# Patient Record
Sex: FEMALE | Race: WHITE | NOT HISPANIC OR LATINO
[De-identification: names, ages, dates, MRNs, and addresses within clinical notes are randomized per-mention and may not be internally consistent; named-entity substitution may affect disease eponyms.]

---

## 2020-11-13 PROBLEM — Z00.00 ENCOUNTER FOR PREVENTIVE HEALTH EXAMINATION: Status: ACTIVE | Noted: 2020-11-13

## 2020-11-17 ENCOUNTER — APPOINTMENT (OUTPATIENT)
Dept: HEMATOLOGY ONCOLOGY | Facility: CLINIC | Age: 70
End: 2020-11-17
Payer: MEDICARE

## 2020-11-17 VITALS
HEART RATE: 84 BPM | TEMPERATURE: 98.5 F | HEIGHT: 65 IN | DIASTOLIC BLOOD PRESSURE: 90 MMHG | OXYGEN SATURATION: 99 % | SYSTOLIC BLOOD PRESSURE: 154 MMHG | RESPIRATION RATE: 18 BRPM | WEIGHT: 153 LBS | BODY MASS INDEX: 25.49 KG/M2

## 2020-11-17 DIAGNOSIS — F51.02 ADJUSTMENT INSOMNIA: ICD-10-CM

## 2020-11-17 DIAGNOSIS — R53.83 OTHER FATIGUE: ICD-10-CM

## 2020-11-17 DIAGNOSIS — Z86.39 PERSONAL HISTORY OF OTHER ENDOCRINE, NUTRITIONAL AND METABOLIC DISEASE: ICD-10-CM

## 2020-11-17 DIAGNOSIS — Z86.2 PERSONAL HISTORY OF DISEASES OF THE BLOOD AND BLOOD-FORMING ORGANS AND CERTAIN DISORDERS INVOLVING THE IMMUNE MECHANISM: ICD-10-CM

## 2020-11-17 DIAGNOSIS — Z87.39 PERSONAL HISTORY OF OTHER DISEASES OF THE MUSCULOSKELETAL SYSTEM AND CONNECTIVE TISSUE: ICD-10-CM

## 2020-11-17 DIAGNOSIS — Z72.89 OTHER PROBLEMS RELATED TO LIFESTYLE: ICD-10-CM

## 2020-11-17 DIAGNOSIS — Z85.9 PERSONAL HISTORY OF MALIGNANT NEOPLASM, UNSPECIFIED: ICD-10-CM

## 2020-11-17 DIAGNOSIS — M81.0 AGE-RELATED OSTEOPOROSIS W/OUT CURRENT PATHOLOGICAL FRACTURE: ICD-10-CM

## 2020-11-17 DIAGNOSIS — Z82.49 FAMILY HISTORY OF ISCHEMIC HEART DISEASE AND OTHER DISEASES OF THE CIRCULATORY SYSTEM: ICD-10-CM

## 2020-11-17 DIAGNOSIS — Z17.0 ESTROGEN RECEPTOR POSITIVE STATUS [ER+]: ICD-10-CM

## 2020-11-17 DIAGNOSIS — Z87.19 PERSONAL HISTORY OF OTHER DISEASES OF THE DIGESTIVE SYSTEM: ICD-10-CM

## 2020-11-17 DIAGNOSIS — D51.9 VITAMIN B12 DEFICIENCY ANEMIA, UNSPECIFIED: ICD-10-CM

## 2020-11-17 DIAGNOSIS — F43.21 ADJUSTMENT DISORDER WITH DEPRESSED MOOD: ICD-10-CM

## 2020-11-17 DIAGNOSIS — Z80.43 FAMILY HISTORY OF MALIGNANT NEOPLASM OF TESTIS: ICD-10-CM

## 2020-11-17 DIAGNOSIS — Z87.440 PERSONAL HISTORY OF URINARY (TRACT) INFECTIONS: ICD-10-CM

## 2020-11-17 PROCEDURE — 99215 OFFICE O/P EST HI 40 MIN: CPT

## 2020-11-17 RX ORDER — PANTOPRAZOLE SODIUM 40 MG/1
40 TABLET, DELAYED RELEASE ORAL
Refills: 0 | Status: ACTIVE | COMMUNITY

## 2020-11-17 RX ORDER — CHOLECALCIFEROL (VITAMIN D3) 10(400)/ML
5000 DROPS ORAL
Refills: 0 | Status: ACTIVE | COMMUNITY

## 2020-11-17 RX ORDER — MULTIVIT-MIN/FOLIC/VIT K/LYCOP 400-300MCG
1000 TABLET ORAL
Refills: 0 | Status: ACTIVE | COMMUNITY

## 2020-11-17 RX ORDER — LINACLOTIDE 145 UG/1
145 CAPSULE, GELATIN COATED ORAL
Refills: 0 | Status: ACTIVE | COMMUNITY

## 2020-11-17 RX ORDER — AMITRIPTYLINE HYDROCHLORIDE 50 MG/1
50 TABLET, FILM COATED ORAL
Refills: 0 | Status: ACTIVE | COMMUNITY

## 2020-11-17 NOTE — HISTORY OF PRESENT ILLNESS
[de-identified] : Ms. 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  She had a lumpectomy and sentinel biopsy revealing 10 mm of tubular carcinoma with no angiolymphatic invasion.  Oncotype DX score was performed and was 16 which is low risk.  She did require a second resection due to a positive margin.  She was seen by another oncologist and was placed on Femara 6 weeks postop but did not tolerated due to increased myofascial pain.  She transferred her care to me in August to 2010 and we discussed possibility of trying different AI's or tamoxifen.  She declined adamantly due to her symptomatic pain syndrome.  She is being followed routinely by rheumatology and other physicians for pain.  She followed regularly with mammograms and ultrasounds as well as breast MRIs. [de-identified] : Has had intermittent mouth sores for 2-3 years\par + fatigue\par + restless leg, on magnesium \par No pica, no CARRERA\par on special diet and supplemets for constipation , now better, on aloe pill seeing functional medicine doctor \par gerd is better , off protonix \par back surgery in feb 2020 \par doing massage , neck pain better \par urti is better \par \par walking again  feeling better \par vit d \par lost weight on purpose  with \par \par Last Mammogram and breast ultrasound: 1/2020 [facility]\par Last Bone density: ~ 5 years ago\par Last colonoscopy: 10/2019\par Last GYN Exam: many years ago, s/p hysterectomy at age 51\par \par

## 2020-11-17 NOTE — ASSESSMENT
[FreeTextEntry1] : THis is a 69 y/o woman with hx of pT1cNo left breast oncotype low risk 16, er pos her2 negative s/p lumpectomy and radiation \par \par 1) breast cancer \par doing well [facility] \par repeat mammo and ultrasound in jan 2021 \par repeat markers \par patient cont to decline any type of antiestrogen including tamoxifen , evista , ai's \par cont close monitiering \par \par 2) osteopenia \par cont vit d and  check level \par dexa every 2 years ordered today \par \par 3) fatigue \par repeat b12 folate tsh and iron studies \par follow up rheumatology \par \par 4) b12 def \par cont b12 check level \par \par 5) constipation \par improved \par s/p follow up gi \par up todate colonoscopy \par \par 6) fibromyalgia and back pain \par improved \par follow up with rheumatology and anestheia \par \par dw patient at length \par follow up in 1 year \par full labs sent today

## 2020-11-17 NOTE — REVIEW OF SYSTEMS
[Fatigue] : fatigue [Negative] : Allergic/Immunologic [Fever] : no fever [Chills] : no chills [Night Sweats] : no night sweats [FreeTextEntry2] : hot flashes/chills [FreeTextEntry3] : + dry eye, on Restasis, helping a bit [FreeTextEntry4] : + dry mouth, attributes it to Cymbalta and amitriptyline, sores in mouth [FreeTextEntry7] : constipation better with dietary changes and vitamin supplements

## 2020-11-17 NOTE — PHYSICAL EXAM
[Restricted in physically strenuous activity but ambulatory and able to carry out work of a light or sedentary nature] : Status 1- Restricted in physically strenuous activity but ambulatory and able to carry out work of a light or sedentary nature, e.g., light house work, office work [Normal] : affect appropriate [de-identified] : no adenopathy , left breast is very dense danay in outer quadrant , no  skin changes , right breast is clear

## 2020-11-17 NOTE — OB HISTORY
[Menarche Age: ____] : age at menarche was [unfilled] [Menopause Age: ____] : age at menopause was [unfilled] [___] : Living: [unfilled]

## 2020-11-17 NOTE — REASON FOR VISIT
[Follow-Up Visit] : a follow-up [FreeTextEntry2] : f/u of breast cancer, left breast infiltrating tubular carcinoma ER+N[place]+ at 90-95%

## 2020-11-18 ENCOUNTER — NON-APPOINTMENT (OUTPATIENT)
Age: 70
End: 2020-11-18

## 2020-11-18 LAB — CEA SERPL-MCNC: 2 NG/ML

## 2020-11-19 LAB
FERRITIN SERPL-MCNC: 189 NG/ML
IRON SATN MFR SERPL: 23 %
IRON SERPL-MCNC: 67 UG/DL
TIBC SERPL-MCNC: 288 UG/DL
UIBC SERPL-MCNC: 221 UG/DL
VIT B12 SERPL-MCNC: 552 PG/ML

## 2020-11-23 LAB
25(OH)D3 SERPL-MCNC: 45 NG/ML
CANCER AG15-3 SERPL-ACNC: 13.8 U/ML

## 2021-04-29 ENCOUNTER — TRANSCRIPTION ENCOUNTER (OUTPATIENT)
Age: 71
End: 2021-04-29

## 2021-11-16 ENCOUNTER — APPOINTMENT (OUTPATIENT)
Dept: HEMATOLOGY ONCOLOGY | Facility: CLINIC | Age: 71
End: 2021-11-16
Payer: MEDICARE

## 2021-11-16 VITALS
SYSTOLIC BLOOD PRESSURE: 140 MMHG | HEIGHT: 65 IN | WEIGHT: 160 LBS | TEMPERATURE: 98.3 F | RESPIRATION RATE: 18 BRPM | BODY MASS INDEX: 26.66 KG/M2 | DIASTOLIC BLOOD PRESSURE: 80 MMHG | HEART RATE: 89 BPM | OXYGEN SATURATION: 100 %

## 2021-11-16 DIAGNOSIS — R53.83 OTHER FATIGUE: ICD-10-CM

## 2021-11-16 DIAGNOSIS — R79.89 OTHER SPECIFIED ABNORMAL FINDINGS OF BLOOD CHEMISTRY: ICD-10-CM

## 2021-11-16 PROCEDURE — 99214 OFFICE O/P EST MOD 30 MIN: CPT

## 2021-11-16 NOTE — HISTORY OF PRESENT ILLNESS
[de-identified] : Ms. 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 She had a lumpectomy and sentinel biopsy revealing 10 mm of tubular carcinoma with no angiolymphatic invasion. Oncotype DX score was performed and was 16 which is low risk. She did require a second resection due to a positive margin. She was seen by another oncologist and was placed on Femara 6 weeks postop but did not tolerated due to increased myofascial pain. She transferred her care to me in August to 2010 and we discussed possibility of trying different AI's or tamoxifen. She declined adamantly due to her symptomatic pain syndrome. She is being followed routinely by rheumatology and other physicians for pain. She followed regularly with mammograms and ultrasounds as well as breast MRIs [de-identified] : Feeling about the same, continues to struggle with joint pains\par mammo  and ultrasound due 1/2022 \par gyn - had hystrectomy does not go to see any more\par colonoscopy up-to-date\par sees rheumatology - a lot joint pains \par second vaccine a lot of pain afterwards\par \par Believes that she had a bone density a year ago but we do not have the results\par

## 2021-11-16 NOTE — PHYSICAL EXAM
[Normal] : affect appropriate [de-identified] :  left breast  continues to be very  dense danay in outer quadrant, patient states that there has been no significant change, no adenopathy no skin changes, no  skin changes , right breast is clear

## 2021-11-16 NOTE — ASSESSMENT
[FreeTextEntry1] : THis is a 71 y/o woman with hx of pT1cNo left breast oncotype low risk 16, er pos her2 negative s/p lumpectomy and radiation \par \par 1) breast cancer \par doing well HENRY although breast is quite dense bilaterally particularly on the left\par repeat mammo and ultrasound in jan 2022 \par repeat markers \par patient cont to decline any type of antiestrogen including tamoxifen , evista , ai's primarily due to the her fears of joint pain getting worse\par cont close monitiering, consider MRI however I am not sure of the role with dense breasts as long as she is getting a 3D mammogram which I advised the patient to do.\par \par 2) osteopenia \par cont vit d and  check level \par Patient states that she had a bone density last year but we did not receive the report I requested that the  obtain this result\par Advised patient active exercise especially weightbearing\par \par 3) fatigue \par repeat b12 folate tsh and iron studies \par follow up rheumatology \par \par 4) b12 def \par cont b12 check level \par \par 5) constipation \par improved \par s/p follow up gi \par up todate colonoscopy \par \par 6) fibromyalgia and back pain \par improved \par follow up with rheumatology and anestheia \par \par dw patient at length \par follow up in 1 year \par full labs sent today

## 2021-11-19 LAB
25(OH)D3 SERPL-MCNC: 43.8 NG/ML
CANCER AG15-3 SERPL-ACNC: 16 U/ML
CEA SERPL-MCNC: 1.4 NG/ML
FERRITIN SERPL-MCNC: 148 NG/ML
FOLATE SERPL-MCNC: 8.5 NG/ML
VIT B12 SERPL-MCNC: 396 PG/ML

## 2021-11-26 ENCOUNTER — NON-APPOINTMENT (OUTPATIENT)
Age: 71
End: 2021-11-26

## 2022-11-16 DIAGNOSIS — M79.7 FIBROMYALGIA: ICD-10-CM

## 2022-11-17 ENCOUNTER — RESULT REVIEW (OUTPATIENT)
Age: 72
End: 2022-11-17

## 2022-11-17 ENCOUNTER — APPOINTMENT (OUTPATIENT)
Dept: HEMATOLOGY ONCOLOGY | Facility: CLINIC | Age: 72
End: 2022-11-17

## 2022-11-17 ENCOUNTER — LABORATORY RESULT (OUTPATIENT)
Age: 72
End: 2022-11-17

## 2022-11-17 VITALS
HEART RATE: 85 BPM | SYSTOLIC BLOOD PRESSURE: 167 MMHG | BODY MASS INDEX: 25.99 KG/M2 | TEMPERATURE: 97.6 F | DIASTOLIC BLOOD PRESSURE: 84 MMHG | OXYGEN SATURATION: 100 % | RESPIRATION RATE: 18 BRPM | WEIGHT: 156 LBS | HEIGHT: 65 IN

## 2022-11-17 PROCEDURE — 99213 OFFICE O/P EST LOW 20 MIN: CPT | Mod: 25

## 2022-11-17 PROCEDURE — 36415 COLL VENOUS BLD VENIPUNCTURE: CPT

## 2022-11-17 RX ORDER — NITROFURANTOIN MACROCRYSTALS 50 MG/1
50 CAPSULE ORAL
Qty: 90 | Refills: 0 | Status: COMPLETED | COMMUNITY
Start: 2022-11-08

## 2022-11-17 RX ORDER — CLOTRIMAZOLE AND BETAMETHASONE DIPROPIONATE 10; .5 MG/G; MG/G
1-0.05 CREAM TOPICAL
Qty: 30 | Refills: 0 | Status: COMPLETED | COMMUNITY
Start: 2022-08-26

## 2022-11-17 RX ORDER — CIPROFLOXACIN HYDROCHLORIDE 500 MG/1
500 TABLET, FILM COATED ORAL
Qty: 14 | Refills: 0 | Status: COMPLETED | COMMUNITY
Start: 2022-10-21

## 2022-11-17 RX ORDER — ACYCLOVIR 800 MG/1
800 TABLET ORAL
Qty: 24 | Refills: 0 | Status: COMPLETED | COMMUNITY
Start: 2022-06-01

## 2022-11-17 RX ORDER — GABAPENTIN 300 MG/1
300 CAPSULE ORAL
Qty: 60 | Refills: 0 | Status: COMPLETED | COMMUNITY
Start: 2022-08-04

## 2022-11-17 RX ORDER — PREDNISONE 50 MG/1
50 TABLET ORAL
Qty: 5 | Refills: 0 | Status: COMPLETED | COMMUNITY
Start: 2022-06-01

## 2022-11-17 RX ORDER — VALACYCLOVIR 500 MG/1
500 TABLET, FILM COATED ORAL
Qty: 30 | Refills: 0 | Status: COMPLETED | COMMUNITY
Start: 2022-06-21

## 2022-11-17 RX ORDER — CYCLOSPORINE 0.5 MG/ML
0.05 EMULSION OPHTHALMIC
Qty: 180 | Refills: 0 | Status: COMPLETED | COMMUNITY
Start: 2022-01-28

## 2022-11-17 RX ORDER — NITROFURANTOIN (MONOHYDRATE/MACROCRYSTALS) 25; 75 MG/1; MG/1
100 CAPSULE ORAL
Qty: 14 | Refills: 0 | Status: COMPLETED | COMMUNITY
Start: 2022-07-23

## 2022-11-17 RX ORDER — ESTRADIOL 0.1 MG/G
0.1 CREAM VAGINAL
Qty: 42 | Refills: 0 | Status: COMPLETED | COMMUNITY
Start: 2021-07-30

## 2022-11-17 RX ORDER — OMEGA-3/DHA/EPA/FISH OIL 300-1000MG
CAPSULE ORAL
Refills: 0 | Status: COMPLETED | COMMUNITY
End: 2022-11-17

## 2022-11-17 RX ORDER — DULOXETINE HYDROCHLORIDE 60 MG/1
60 CAPSULE, DELAYED RELEASE PELLETS ORAL
Qty: 180 | Refills: 0 | Status: COMPLETED | COMMUNITY
Start: 2022-05-20

## 2022-11-17 RX ORDER — FLUCONAZOLE 200 MG/1
200 TABLET ORAL
Qty: 1 | Refills: 0 | Status: COMPLETED | COMMUNITY
Start: 2022-08-26

## 2022-11-17 RX ORDER — NEOMYCIN AND POLYMYXIN B SULFATES AND DEXAMETHASONE 3.5; 10000; 1 MG/G; [IU]/G; MG/G
3.5-10000-0.1 OINTMENT OPHTHALMIC
Qty: 4 | Refills: 0 | Status: COMPLETED | COMMUNITY
Start: 2022-08-09

## 2022-11-17 RX ORDER — HYDROCORTISONE 25 MG/G
2.5 CREAM TOPICAL
Qty: 28 | Refills: 0 | Status: COMPLETED | COMMUNITY
Start: 2022-08-10

## 2022-11-17 RX ORDER — TRAMADOL HYDROCHLORIDE 50 MG/1
50 TABLET, COATED ORAL
Qty: 14 | Refills: 0 | Status: COMPLETED | COMMUNITY
Start: 2022-08-04

## 2022-11-17 RX ORDER — VITAMIN B COMPLEX
CAPSULE ORAL
Refills: 0 | Status: ACTIVE | COMMUNITY

## 2022-11-17 RX ORDER — COVID-19 ANTIGEN TEST
KIT MISCELLANEOUS
Qty: 8 | Refills: 0 | Status: COMPLETED | COMMUNITY
Start: 2022-11-10

## 2022-11-17 RX ORDER — SULFAMETHOXAZOLE AND TRIMETHOPRIM 800; 160 MG/1; MG/1
800-160 TABLET ORAL
Qty: 14 | Refills: 0 | Status: COMPLETED | COMMUNITY
Start: 2022-08-03

## 2022-11-17 RX ORDER — METHENAMINE HIPPURATE 1 G/1
1 TABLET ORAL
Qty: 180 | Refills: 0 | Status: COMPLETED | COMMUNITY
Start: 2022-08-03

## 2022-11-17 RX ORDER — LEVOTHYROXINE SODIUM 50 MCG
50 TABLET ORAL
Refills: 0 | Status: COMPLETED | COMMUNITY
End: 2022-11-17

## 2022-11-17 RX ORDER — VALACYCLOVIR 1 G/1
1 TABLET, FILM COATED ORAL
Qty: 30 | Refills: 0 | Status: COMPLETED | COMMUNITY
Start: 2022-06-21

## 2022-11-24 NOTE — PHYSICAL EXAM
[Normal] : affect appropriate [Restricted in physically strenuous activity but ambulatory and able to carry out work of a light or sedentary nature] : Status 1- Restricted in physically strenuous activity but ambulatory and able to carry out work of a light or sedentary nature, e.g., light house work, office work [de-identified] :  left breast  continues to be very  dense danay in outer quadrant, patient states that there has been no significant change, no adenopathy no skin changes, no  skin changes , right breast is clear

## 2022-11-24 NOTE — HISTORY OF PRESENT ILLNESS
[de-identified] : Ms. 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 She had a lumpectomy and sentinel biopsy revealing 10 mm of tubular carcinoma with no angiolymphatic invasion. Oncotype DX score was performed and was 16 which is low risk. She did require a second resection due to a positive margin. She was seen by another oncologist and was placed on Femara 6 weeks postop but did not tolerated due to increased myofascial pain. She transferred her care to me in August to 2010 and we discussed possibility of trying different AI's or tamoxifen. She declined adamantly due to her symptomatic pain syndrome. She is being followed routinely by rheumatology and other physicians for pain. She followed regularly with mammograms and ultrasounds as well as breast MRIs [de-identified] : \par

## 2022-11-24 NOTE — ASSESSMENT
[FreeTextEntry1] : THis is a 69 y/o woman with hx of pT1cNo left breast oncotype low risk 16, er pos her2 negative s/p lumpectomy and radiation \par Tubulara carcinoma --favourable profile\par \par 1) breast cancer \par doing well HENRY although breast is quite dense bilaterally particularly on the left\par mammo and ultrasound in jan 2022 benign\par patient cont to decline any type of antiestrogen including tamoxifen , evista , ai's primarily due to the her fears of joint pain getting worse\par \par \par 2) osteopenia \par cont vit d \par \par \par 3) fatigue \par repeat b12 folate tsh and iron studies \par follow up rheumatology \par \par 4) b12 def \par cont b12 check level \par \par \par 6) fibromyalgia and back pain \par improved \par follow up with rheumatology \par \par 7) health maintenance\par colonoscopy 1/2023\par

## 2023-08-09 DIAGNOSIS — E53.8 DEFICIENCY OF OTHER SPECIFIED B GROUP VITAMINS: ICD-10-CM

## 2023-08-10 ENCOUNTER — RESULT REVIEW (OUTPATIENT)
Age: 73
End: 2023-08-10

## 2023-08-10 ENCOUNTER — APPOINTMENT (OUTPATIENT)
Dept: HEMATOLOGY ONCOLOGY | Facility: CLINIC | Age: 73
End: 2023-08-10
Payer: MEDICARE

## 2023-08-10 VITALS
BODY MASS INDEX: 25.33 KG/M2 | OXYGEN SATURATION: 100 % | WEIGHT: 152 LBS | SYSTOLIC BLOOD PRESSURE: 152 MMHG | HEART RATE: 92 BPM | HEIGHT: 65 IN | TEMPERATURE: 98.5 F | RESPIRATION RATE: 18 BRPM | DIASTOLIC BLOOD PRESSURE: 73 MMHG

## 2023-08-10 PROCEDURE — 99213 OFFICE O/P EST LOW 20 MIN: CPT | Mod: 25

## 2023-08-10 PROCEDURE — 36415 COLL VENOUS BLD VENIPUNCTURE: CPT

## 2023-08-20 NOTE — HISTORY OF PRESENT ILLNESS
[de-identified] : Ms. 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 She had a lumpectomy and sentinel biopsy revealing 10 mm of tubular carcinoma with no angiolymphatic invasion. Oncotype DX score was performed and was 16 which is low risk. She did require a second resection due to a positive margin. She was seen by another oncologist and was placed on Femara 6 weeks postop but did not tolerated due to increased myofascial pain. She transferred her care to [facility] in August to 2010 and we discussed possibility of trying different AI's or tamoxifen. She declined adamantly due to her symptomatic pain syndrome.  [de-identified] : \par

## 2023-08-20 NOTE — PHYSICAL EXAM
[Restricted in physically strenuous activity but ambulatory and able to carry out work of a light or sedentary nature] : Status 1- Restricted in physically strenuous activity but ambulatory and able to carry out work of a light or sedentary nature, e.g., light house work, office work [Normal] : affect appropriate [de-identified] :  Left nipple inverted.left breast  continues to be very  dense danay in outer quadrant, patient states that there has been no significant change, no adenopathy no skin changes, no  skin changes , right breast is clear

## 2023-08-20 NOTE — ASSESSMENT
[FreeTextEntry1] : THis is a 71 y/o woman with hx of pT1cNo left breast cancer in 2008, oncotype low risk 16, er pos her2 negative s/p lumpectomy and radiation  Tubulara carcinoma --favourable profile  1) breast cancer  doing well HENRY although breast is quite dense bilaterally particularly on the left mammo and ultrasound in 8/3/23 . Heterogeneously dense breasts check MRI breast b/l  2) osteopenia  cont vit d   3) health maintenance colonoscopy 1/2023  RTC 6 months

## 2023-10-01 LAB
FERRITIN SERPL-MCNC: 137 NG/ML
IRON SATN MFR SERPL: 28 %
IRON SERPL-MCNC: 89 UG/DL
TIBC SERPL-MCNC: 316 UG/DL
UIBC SERPL-MCNC: 227 UG/DL

## 2024-02-08 ENCOUNTER — APPOINTMENT (OUTPATIENT)
Dept: HEMATOLOGY ONCOLOGY | Facility: CLINIC | Age: 74
End: 2024-02-08

## 2024-02-13 ENCOUNTER — APPOINTMENT (OUTPATIENT)
Dept: HEMATOLOGY ONCOLOGY | Facility: CLINIC | Age: 74
End: 2024-02-13
Payer: MEDICARE

## 2024-02-26 ENCOUNTER — LABORATORY RESULT (OUTPATIENT)
Age: 74
End: 2024-02-26

## 2024-02-26 ENCOUNTER — RESULT REVIEW (OUTPATIENT)
Age: 74
End: 2024-02-26

## 2024-02-26 ENCOUNTER — APPOINTMENT (OUTPATIENT)
Dept: HEMATOLOGY ONCOLOGY | Facility: CLINIC | Age: 74
End: 2024-02-26
Payer: MEDICARE

## 2024-02-26 VITALS
DIASTOLIC BLOOD PRESSURE: 91 MMHG | RESPIRATION RATE: 18 BRPM | OXYGEN SATURATION: 100 % | HEART RATE: 78 BPM | BODY MASS INDEX: 24.72 KG/M2 | TEMPERATURE: 97.3 F | WEIGHT: 148.38 LBS | SYSTOLIC BLOOD PRESSURE: 151 MMHG | HEIGHT: 65 IN

## 2024-02-26 DIAGNOSIS — C50.912 MALIGNANT NEOPLASM OF UNSPECIFIED SITE OF LEFT FEMALE BREAST: ICD-10-CM

## 2024-02-26 PROCEDURE — 99214 OFFICE O/P EST MOD 30 MIN: CPT

## 2024-02-26 PROCEDURE — G2211 COMPLEX E/M VISIT ADD ON: CPT

## 2024-02-26 RX ORDER — ALOE VERA 500 MG
CAPSULE ORAL
Refills: 0 | Status: ACTIVE | COMMUNITY

## 2024-02-26 RX ORDER — LEVOTHYROXINE SODIUM 50 UG/1
50 TABLET ORAL
Refills: 0 | Status: ACTIVE | COMMUNITY

## 2024-02-26 RX ORDER — DULOXETINE HYDROCHLORIDE 60 MG/1
60 CAPSULE, DELAYED RELEASE PELLETS ORAL
Refills: 0 | Status: COMPLETED | COMMUNITY
End: 2024-02-26

## 2024-02-26 NOTE — PHYSICAL EXAM
[de-identified] :  Left nipple inverted.left breast  continues to be very  dense danay in outer quadrant, patient states that there has been no significant change, no adenopathy no skin changes, no  skin changes , right breast is clear

## 2024-02-26 NOTE — ASSESSMENT
[FreeTextEntry1] : THis is a 71 y/o woman with hx of pT1cNo left breast cancer in 2008, oncotype low risk 16, er pos her2 negative s/p lumpectomy and radiation.  Tubulara carcinoma --favorable profile  1) breast cancer  doing well HENRY although breast is quite dense bilaterally particularly on the left mammo and ultrasound in 1/2023 . Heterogeneously dense breasts not interested in taking AI Plan for closer monitoring with imaging repeat mammogram in 6 months consider to check MRI breast b/l   2) osteopenia  cont vit d   3) health maintenance colonoscopy 10/2023. Told to return in 5 years Dentist 10/2023  RTC 6 months

## 2024-02-26 NOTE — HISTORY OF PRESENT ILLNESS
[de-identified] : Ms. 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 She had a lumpectomy and sentinel biopsy revealing 10 mm of tubular carcinoma with no angiolymphatic invasion. Oncotype DX score was performed and was 16 which is low risk. She did require a second resection due to a positive margin. She was seen by another oncologist and was placed on Femara 6 weeks postop but did not tolerated due to increased myofascial pain. She transferred her care to [facility] in August to 2010 and we discussed possibility of trying different AI's. States she also tried tamoxifen which also caused pain. She declined adamantly due to her symptomatic pain syndrome.  [de-identified] : \par   [ECOG Performance Status: 0 - Fully active, able to carry on all pre-disease performance without restriction] : Performance Status: 0 - Fully active, able to carry on all pre-disease performance without restriction

## 2024-08-20 ENCOUNTER — APPOINTMENT (OUTPATIENT)
Dept: HEMATOLOGY ONCOLOGY | Facility: CLINIC | Age: 74
End: 2024-08-20
Payer: MEDICARE

## 2024-08-20 VITALS
OXYGEN SATURATION: 99 % | HEIGHT: 65 IN | TEMPERATURE: 97.6 F | SYSTOLIC BLOOD PRESSURE: 173 MMHG | RESPIRATION RATE: 18 BRPM | WEIGHT: 148 LBS | HEART RATE: 64 BPM | BODY MASS INDEX: 24.66 KG/M2 | DIASTOLIC BLOOD PRESSURE: 63 MMHG

## 2024-08-20 DIAGNOSIS — R79.89 OTHER SPECIFIED ABNORMAL FINDINGS OF BLOOD CHEMISTRY: ICD-10-CM

## 2024-08-20 DIAGNOSIS — C50.912 MALIGNANT NEOPLASM OF UNSPECIFIED SITE OF LEFT FEMALE BREAST: ICD-10-CM

## 2024-08-20 PROCEDURE — 99214 OFFICE O/P EST MOD 30 MIN: CPT

## 2024-08-20 PROCEDURE — G2211 COMPLEX E/M VISIT ADD ON: CPT

## 2024-08-21 NOTE — PHYSICAL EXAM
[Restricted in physically strenuous activity but ambulatory and able to carry out work of a light or sedentary nature] : Status 1- Restricted in physically strenuous activity but ambulatory and able to carry out work of a light or sedentary nature, e.g., light house work, office work [Normal] : affect appropriate [de-identified] :  Left nipple inverted.left breast  continues to be very  dense danay in outer quadrant, patient states that there has been no significant change, no adenopathy no skin changes, no  skin changes , right breast is clear

## 2024-08-21 NOTE — HISTORY OF PRESENT ILLNESS
[ECOG Performance Status: 0 - Fully active, able to carry on all pre-disease performance without restriction] : Performance Status: 0 - Fully active, able to carry on all pre-disease performance without restriction [de-identified] : Ms. 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 She had a lumpectomy and sentinel biopsy revealing 10 mm of tubular carcinoma with no angiolymphatic invasion. Oncotype DX score was performed and was 16 which is low risk. She did require a second resection due to a positive margin. She was seen by another oncologist and was placed on Femara 6 weeks postop but did not tolerated due to increased myofascial pain. She transferred her care to [facility] in August to 2010 and we discussed possibility of trying different AI's. States she also tried tamoxifen which also caused pain. She declined adamantly due to her symptomatic pain syndrome.  [de-identified] : here for follow up Denies any complaints repeat mammogram negative. has not noted changes in breasts or palpable masses. now ~15 years since diagnosis in 2009 Had cataract surgery that needs to be redone.  [0 - No Distress] : Distress Level: 0

## 2024-08-21 NOTE — HISTORY OF PRESENT ILLNESS
[ECOG Performance Status: 0 - Fully active, able to carry on all pre-disease performance without restriction] : Performance Status: 0 - Fully active, able to carry on all pre-disease performance without restriction [de-identified] : Ms. 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 She had a lumpectomy and sentinel biopsy revealing 10 mm of tubular carcinoma with no angiolymphatic invasion. Oncotype DX score was performed and was 16 which is low risk. She did require a second resection due to a positive margin. She was seen by another oncologist and was placed on Femara 6 weeks postop but did not tolerated due to increased myofascial pain. She transferred her care to [facility] in August to 2010 and we discussed possibility of trying different AI's. States she also tried tamoxifen which also caused pain. She declined adamantly due to her symptomatic pain syndrome.  [de-identified] : here for follow up Denies any complaints repeat mammogram negative. has not noted changes in breasts or palpable masses. now ~15 years since diagnosis in 2009 Had cataract surgery that needs to be redone.  [0 - No Distress] : Distress Level: 0

## 2024-08-21 NOTE — ASSESSMENT
[FreeTextEntry1] : THis is a 73 y/o woman with hx of pT1cNo left breast cancer in 2008, oncotype low risk 16, er pos her2 negative s/p lumpectomy and radiation.  Tubulara carcinoma --favorable profile  1) breast cancer  doing well HENRY although breast is quite dense bilaterally particularly on the left mammo and ultrasound in 1/2023 . Heterogeneously dense breasts not interested in taking AI Plan for continued monitoring with yearly mammograms repeat mammogram in 1 year consider to check MRI breast b/l  given ~15 years since diagnosis. ok to follow up with PCP for yearly mammograms and breast exams.  2) osteopenia  cont vit d   3) health maintenance colonoscopy 10/2023. Told to return in 5 years Dentist 10/2023  RTC 6 months

## 2024-08-21 NOTE — ASSESSMENT
[FreeTextEntry1] : THis is a 75 y/o woman with hx of pT1cNo left breast cancer in 2008, oncotype low risk 16, er pos her2 negative s/p lumpectomy and radiation.  Tubulara carcinoma --favorable profile  1) breast cancer  doing well HENRY although breast is quite dense bilaterally particularly on the left mammo and ultrasound in 1/2023 . Heterogeneously dense breasts not interested in taking AI Plan for continued monitoring with yearly mammograms repeat mammogram in 1 year consider to check MRI breast b/l  given ~15 years since diagnosis. ok to follow up with PCP for yearly mammograms and breast exams.  2) osteopenia  cont vit d   3) health maintenance colonoscopy 10/2023. Told to return in 5 years Dentist 10/2023  RTC 6 months

## 2024-08-21 NOTE — PHYSICAL EXAM
[Restricted in physically strenuous activity but ambulatory and able to carry out work of a light or sedentary nature] : Status 1- Restricted in physically strenuous activity but ambulatory and able to carry out work of a light or sedentary nature, e.g., light house work, office work [Normal] : affect appropriate [de-identified] :  Left nipple inverted.left breast  continues to be very  dense danay in outer quadrant, patient states that there has been no significant change, no adenopathy no skin changes, no  skin changes , right breast is clear

## 2025-01-29 DIAGNOSIS — R79.89 OTHER SPECIFIED ABNORMAL FINDINGS OF BLOOD CHEMISTRY: ICD-10-CM

## 2025-01-29 DIAGNOSIS — C50.912 MALIGNANT NEOPLASM OF UNSPECIFIED SITE OF LEFT FEMALE BREAST: ICD-10-CM

## 2025-01-29 DIAGNOSIS — M79.7 FIBROMYALGIA: ICD-10-CM

## 2025-01-29 DIAGNOSIS — E53.8 DEFICIENCY OF OTHER SPECIFIED B GROUP VITAMINS: ICD-10-CM

## 2025-01-29 DIAGNOSIS — R53.83 OTHER FATIGUE: ICD-10-CM

## 2025-03-18 ENCOUNTER — RESULT REVIEW (OUTPATIENT)
Age: 75
End: 2025-03-18

## 2025-03-18 ENCOUNTER — APPOINTMENT (OUTPATIENT)
Dept: HEMATOLOGY ONCOLOGY | Facility: CLINIC | Age: 75
End: 2025-03-18
Payer: MEDICARE

## 2025-03-18 VITALS
HEIGHT: 65 IN | DIASTOLIC BLOOD PRESSURE: 71 MMHG | SYSTOLIC BLOOD PRESSURE: 157 MMHG | OXYGEN SATURATION: 100 % | WEIGHT: 143.3 LBS | BODY MASS INDEX: 23.88 KG/M2 | RESPIRATION RATE: 18 BRPM | HEART RATE: 55 BPM | TEMPERATURE: 96.3 F

## 2025-03-18 DIAGNOSIS — C50.912 MALIGNANT NEOPLASM OF UNSPECIFIED SITE OF LEFT FEMALE BREAST: ICD-10-CM

## 2025-03-18 PROCEDURE — G2211 COMPLEX E/M VISIT ADD ON: CPT

## 2025-03-18 PROCEDURE — 99214 OFFICE O/P EST MOD 30 MIN: CPT

## 2025-03-18 RX ORDER — MAGNESIUM OXIDE/MAG AA CHELATE 300 MG
CAPSULE ORAL
Refills: 0 | Status: ACTIVE | COMMUNITY

## 2025-03-18 RX ORDER — ACETAMINOPHEN 325 MG
TABLET ORAL
Refills: 0 | Status: ACTIVE | COMMUNITY

## 2025-03-18 RX ORDER — CALCIUM CARBONATE/VITAMIN D3 600 MG-10
TABLET ORAL
Refills: 0 | Status: ACTIVE | COMMUNITY

## 2025-03-18 RX ORDER — BACILLUS COAGULANS/INULIN 1B-250 MG
CAPSULE ORAL
Refills: 0 | Status: ACTIVE | COMMUNITY

## 2025-03-18 RX ORDER — ATENOLOL 25 MG/1
25 TABLET ORAL
Refills: 0 | Status: ACTIVE | COMMUNITY

## 2025-03-18 RX ORDER — LORATADINE 10 MG
17 TABLET,DISINTEGRATING ORAL
Refills: 0 | Status: ACTIVE | COMMUNITY

## 2025-09-16 ENCOUNTER — APPOINTMENT (OUTPATIENT)
Dept: HEMATOLOGY ONCOLOGY | Facility: CLINIC | Age: 75
End: 2025-09-16